# Patient Record
Sex: MALE | Race: BLACK OR AFRICAN AMERICAN | NOT HISPANIC OR LATINO | Employment: UNEMPLOYED | RURAL
[De-identification: names, ages, dates, MRNs, and addresses within clinical notes are randomized per-mention and may not be internally consistent; named-entity substitution may affect disease eponyms.]

---

## 2022-03-11 ENCOUNTER — HOSPITAL ENCOUNTER (EMERGENCY)
Facility: HOSPITAL | Age: 57
Discharge: HOME | End: 2022-03-12
Payer: MEDICARE

## 2022-03-11 DIAGNOSIS — N17.9 AKI (ACUTE KIDNEY INJURY): ICD-10-CM

## 2022-03-11 DIAGNOSIS — R06.02 SOB (SHORTNESS OF BREATH): ICD-10-CM

## 2022-03-11 DIAGNOSIS — R73.9 HYPERGLYCEMIA: Primary | ICD-10-CM

## 2022-03-11 LAB
ACETONE SERPL QL SCN: NEGATIVE
ALBUMIN SERPL BCP-MCNC: 3.7 G/DL (ref 3.5–5)
ALBUMIN/GLOB SERPL: 1.1 {RATIO}
ALP SERPL-CCNC: 137 U/L (ref 45–115)
ALT SERPL W P-5'-P-CCNC: 36 U/L (ref 16–61)
ANION GAP SERPL CALCULATED.3IONS-SCNC: 10 MMOL/L (ref 7–16)
AST SERPL W P-5'-P-CCNC: 11 U/L (ref 15–37)
BASOPHILS # BLD AUTO: 0.05 K/UL (ref 0–0.2)
BASOPHILS NFR BLD AUTO: 0.6 % (ref 0–1)
BILIRUB SERPL-MCNC: 0.3 MG/DL (ref 0–1.2)
BUN SERPL-MCNC: 21 MG/DL (ref 7–18)
BUN/CREAT SERPL: 15 (ref 6–20)
CALCIUM SERPL-MCNC: 9 MG/DL (ref 8.5–10.1)
CHLORIDE SERPL-SCNC: 102 MMOL/L (ref 98–107)
CO2 SERPL-SCNC: 27 MMOL/L (ref 21–32)
CREAT SERPL-MCNC: 1.39 MG/DL (ref 0.7–1.3)
DIFFERENTIAL METHOD BLD: ABNORMAL
EOSINOPHIL # BLD AUTO: 0.35 K/UL (ref 0–0.5)
EOSINOPHIL NFR BLD AUTO: 4.2 % (ref 1–4)
EOSINOPHIL NFR BLD MANUAL: 3 % (ref 1–4)
ERYTHROCYTE [DISTWIDTH] IN BLOOD BY AUTOMATED COUNT: 13 % (ref 11.5–14.5)
GLOBULIN SER-MCNC: 3.3 G/DL (ref 2–4)
GLUCOSE SERPL-MCNC: 162 MG/DL (ref 70–105)
GLUCOSE SERPL-MCNC: 467 MG/DL (ref 70–105)
GLUCOSE SERPL-MCNC: 492 MG/DL (ref 74–106)
HCT VFR BLD AUTO: 40.8 % (ref 40–54)
HGB BLD-MCNC: 14 G/DL (ref 13.5–18)
IMM GRANULOCYTES # BLD AUTO: 0.02 K/UL (ref 0–0.04)
IMM GRANULOCYTES NFR BLD: 0.2 % (ref 0–0.4)
LIPASE SERPL-CCNC: 274 U/L (ref 73–393)
LYMPHOCYTES # BLD AUTO: 2.85 K/UL (ref 1–4.8)
LYMPHOCYTES NFR BLD AUTO: 34.3 % (ref 27–41)
LYMPHOCYTES NFR BLD MANUAL: 38 % (ref 27–41)
MCH RBC QN AUTO: 30.6 PG (ref 27–31)
MCHC RBC AUTO-ENTMCNC: 34.3 G/DL (ref 32–36)
MCV RBC AUTO: 89.3 FL (ref 80–96)
MONOCYTES # BLD AUTO: 0.52 K/UL (ref 0–0.8)
MONOCYTES NFR BLD AUTO: 6.3 % (ref 2–6)
MONOCYTES NFR BLD MANUAL: 6 % (ref 2–6)
MPC BLD CALC-MCNC: 12 FL (ref 9.4–12.4)
NEUTROPHILS # BLD AUTO: 4.53 K/UL (ref 1.8–7.7)
NEUTROPHILS NFR BLD AUTO: 54.4 % (ref 53–65)
NEUTS SEG NFR BLD MANUAL: 53 % (ref 50–62)
NRBC # BLD AUTO: 0 X10E3/UL
NRBC, AUTO (.00): 0 %
PLATELET # BLD AUTO: 193 K/UL (ref 150–400)
PLATELET MORPHOLOGY: NORMAL
POTASSIUM SERPL-SCNC: 4.4 MMOL/L (ref 3.5–5.1)
PROT SERPL-MCNC: 7 G/DL (ref 6.4–8.2)
RBC # BLD AUTO: 4.57 M/UL (ref 4.6–6.2)
RBC MORPH BLD: NORMAL
SODIUM SERPL-SCNC: 135 MMOL/L (ref 136–145)
WBC # BLD AUTO: 8.32 K/UL (ref 4.5–11)

## 2022-03-11 PROCEDURE — 82962 GLUCOSE BLOOD TEST: CPT | Mod: 91

## 2022-03-11 PROCEDURE — 80053 COMPREHEN METABOLIC PANEL: CPT | Performed by: REGISTERED NURSE

## 2022-03-11 PROCEDURE — 99284 EMERGENCY DEPT VISIT MOD MDM: CPT | Mod: ,,, | Performed by: REGISTERED NURSE

## 2022-03-11 PROCEDURE — 25000003 PHARM REV CODE 250: Performed by: REGISTERED NURSE

## 2022-03-11 PROCEDURE — 36415 COLL VENOUS BLD VENIPUNCTURE: CPT | Performed by: REGISTERED NURSE

## 2022-03-11 PROCEDURE — 96374 THER/PROPH/DIAG INJ IV PUSH: CPT

## 2022-03-11 PROCEDURE — 99284 PR EMERGENCY DEPT VISIT,LEVEL IV: ICD-10-PCS | Mod: ,,, | Performed by: REGISTERED NURSE

## 2022-03-11 PROCEDURE — 63600175 PHARM REV CODE 636 W HCPCS: Performed by: REGISTERED NURSE

## 2022-03-11 PROCEDURE — 82009 KETONE BODYS QUAL: CPT | Performed by: REGISTERED NURSE

## 2022-03-11 PROCEDURE — 93010 EKG 12-LEAD: ICD-10-PCS | Mod: ,,, | Performed by: INTERNAL MEDICINE

## 2022-03-11 PROCEDURE — 99285 EMERGENCY DEPT VISIT HI MDM: CPT | Mod: 25

## 2022-03-11 PROCEDURE — 81003 URINALYSIS AUTO W/O SCOPE: CPT | Performed by: REGISTERED NURSE

## 2022-03-11 PROCEDURE — 96361 HYDRATE IV INFUSION ADD-ON: CPT

## 2022-03-11 PROCEDURE — 93005 ELECTROCARDIOGRAM TRACING: CPT

## 2022-03-11 PROCEDURE — 93010 ELECTROCARDIOGRAM REPORT: CPT | Mod: ,,, | Performed by: INTERNAL MEDICINE

## 2022-03-11 PROCEDURE — 83690 ASSAY OF LIPASE: CPT | Performed by: REGISTERED NURSE

## 2022-03-11 PROCEDURE — 85025 COMPLETE CBC W/AUTO DIFF WBC: CPT | Performed by: REGISTERED NURSE

## 2022-03-11 RX ORDER — INSULIN DEGLUDEC 100 U/ML
INJECTION, SOLUTION SUBCUTANEOUS DAILY
COMMUNITY

## 2022-03-11 RX ORDER — GABAPENTIN 300 MG/1
600 CAPSULE ORAL ONCE
Status: COMPLETED | OUTPATIENT
Start: 2022-03-12 | End: 2022-03-11

## 2022-03-11 RX ADMIN — GABAPENTIN 600 MG: 300 CAPSULE ORAL at 11:03

## 2022-03-11 RX ADMIN — HUMAN INSULIN 10 UNITS: 100 INJECTION, SOLUTION SUBCUTANEOUS at 10:03

## 2022-03-11 RX ADMIN — SODIUM CHLORIDE 1000 ML: 9 INJECTION, SOLUTION INTRAVENOUS at 10:03

## 2022-03-12 VITALS
OXYGEN SATURATION: 99 % | HEIGHT: 74 IN | RESPIRATION RATE: 13 BRPM | DIASTOLIC BLOOD PRESSURE: 87 MMHG | HEART RATE: 88 BPM | TEMPERATURE: 98 F | SYSTOLIC BLOOD PRESSURE: 134 MMHG | BODY MASS INDEX: 32.73 KG/M2 | WEIGHT: 255 LBS

## 2022-03-12 LAB
BILIRUB UR QL STRIP: NEGATIVE
CLARITY UR: CLEAR
COLOR UR: YELLOW
GLUCOSE UR STRIP-MCNC: >=1000 MG/DL
KETONES UR STRIP-SCNC: NEGATIVE MG/DL
LEUKOCYTE ESTERASE UR QL STRIP: NEGATIVE
NITRITE UR QL STRIP: NEGATIVE
PH UR STRIP: 6 PH UNITS
PROT UR QL STRIP: NEGATIVE
RBC # UR STRIP: NEGATIVE /UL
SP GR UR STRIP: <=1.005
UROBILINOGEN UR STRIP-ACNC: 0.2 MG/DL

## 2022-03-12 RX ORDER — GABAPENTIN 600 MG/1
600 TABLET ORAL 3 TIMES DAILY
COMMUNITY

## 2022-03-12 NOTE — ED PROVIDER NOTES
Encounter Date: 3/11/2022    SCRIBE #1 NOTE: I, Naila Hernandez, am scribing for, and in the presence of,  Srini Bowen MD. . I have scribed the following portions of the note - the EKG reading.       History     Chief Complaint   Patient presents with    Hyperglycemia     55 y/o male presents to ED complaining of elevated BS and abdominal pain ongoing for 2 days. Pt reports decreased appetite for 2 days. Denies fever, sob, n/v, chest pain, diarrhea or recent illness. Upon questioning he reports being out of Insulin and has not had any in 3 days. Pt has a past hx of IDDM, Gout, HTN, GERD, arthritis, tobacco smoking, EtOH use, and drug use. His listed PCP is Dr. Rodríguez.          Review of patient's allergies indicates:  No Known Allergies  Past Medical History:   Diagnosis Date    Diabetes mellitus     Gout, unspecified      Past Surgical History:   Procedure Laterality Date    HERNIA REPAIR      PROSTATE SURGERY       History reviewed. No pertinent family history.  Social History     Tobacco Use    Smoking status: Current Every Day Smoker     Types: Cigarettes    Smokeless tobacco: Never Used   Substance Use Topics    Alcohol use: Not Currently    Drug use: Never     Review of Systems   Constitutional: Positive for appetite change. Negative for activity change, chills, diaphoresis and fever.   HENT: Negative for congestion.    Respiratory: Negative for chest tightness and shortness of breath.    Cardiovascular: Negative for chest pain and palpitations.   Gastrointestinal: Positive for abdominal pain. Negative for anal bleeding, blood in stool, diarrhea, nausea and vomiting.   Genitourinary: Negative for dysuria.   Neurological: Positive for weakness. Negative for dizziness, light-headedness and headaches.       Physical Exam     Initial Vitals [03/11/22 2159]   BP Pulse Resp Temp SpO2   127/75 105 18 98.2 °F (36.8 °C) 98 %      MAP       --         Physical Exam    Vitals reviewed.  Constitutional:  He appears well-developed. He is not diaphoretic.   HENT:   Head: Normocephalic and atraumatic.   Eyes: EOM are normal. Pupils are equal, round, and reactive to light.   Neck: Neck supple.   Normal range of motion.  Cardiovascular: Normal rate and intact distal pulses.   No murmur heard.  Pulmonary/Chest: Breath sounds normal. No respiratory distress.   Abdominal: Abdomen is soft. Bowel sounds are normal. He exhibits no distension. There is no abdominal tenderness.   Musculoskeletal:         General: Normal range of motion.      Cervical back: Normal range of motion and neck supple.     Lymphadenopathy:     He has no cervical adenopathy.   Neurological: He is alert and oriented to person, place, and time. No cranial nerve deficit. GCS score is 15. GCS eye subscore is 4. GCS verbal subscore is 5. GCS motor subscore is 6.   Skin: Skin is warm and dry. Capillary refill takes less than 2 seconds.   Psychiatric: He has a normal mood and affect.         ED Course   Procedures  Labs Reviewed   COMPREHENSIVE METABOLIC PANEL - Abnormal; Notable for the following components:       Result Value    Sodium 135 (*)     Glucose 492 (*)     BUN 21 (*)     Creatinine 1.39 (*)     Alk Phos 137 (*)     AST 11 (*)     eGFR 56 (*)     All other components within normal limits   CBC WITH DIFFERENTIAL - Abnormal; Notable for the following components:    RBC 4.57 (*)     Monocytes % 6.3 (*)     Eosinophils % 4.2 (*)     All other components within normal limits   POCT GLUCOSE MONITORING CONTINUOUS - Abnormal; Notable for the following components:    POC Glucose 467 (*)     All other components within normal limits   LIPASE - Normal   CBC W/ AUTO DIFFERENTIAL    Narrative:     The following orders were created for panel order CBC auto differential.  Procedure                               Abnormality         Status                     ---------                               -----------         ------                     CBC with  Differential[[id number]]        Abnormal            Final result               Manual Differential[[id number]]                              Final result                 Please view results for these tests on the individual orders.   ACETONE   MANUAL DIFFERENTIAL   URINALYSIS     EKG Readings: (Independently Interpreted)   Heart Rate: 101.   Interpreted by Dr. Bowen:  Sinus Tachycardia.        Imaging Results          X-Ray Chest AP Portable (In process)                  Medications   gabapentin capsule 600 mg (has no administration in time range)   sodium chloride 0.9% bolus 1,000 mL (0 mLs Intravenous Stopped 3/11/22 2328)   insulin regular injection 10 Units (10 Units Intravenous Given 3/11/22 2226)                          Clinical Impression:   Final diagnoses:  [R06.02] SOB (shortness of breath)  [R73.9] Hyperglycemia (Primary)  [N17.9] ANNY (acute kidney injury)          ED Disposition Condition    Discharge Stable        ED Prescriptions     None        Follow-up Information    None          JOHNNA Jaime  03/11/22 2330       JOHNNA Jaime  03/11/22 7838

## 2022-03-12 NOTE — ED TRIAGE NOTES
Pt in with cc of hyperglycemia, pt is currently at [facility] [place] for alcohol detox and the patient states he ran out of his medicine while there.

## 2022-03-12 NOTE — ED NOTES
Discharge instructions reviewed with patient. Patient states he needs prescriptions for insulin and gabapentin. Dr Bowen informed and instructed the patient to follow up with primary care MD

## 2023-08-12 ENCOUNTER — HOSPITAL ENCOUNTER (EMERGENCY)
Facility: HOSPITAL | Age: 58
Discharge: HOME | End: 2023-08-12
Payer: MEDICARE

## 2023-08-12 VITALS
OXYGEN SATURATION: 100 % | BODY MASS INDEX: 34.36 KG/M2 | DIASTOLIC BLOOD PRESSURE: 91 MMHG | HEIGHT: 70 IN | WEIGHT: 240 LBS | HEART RATE: 103 BPM | TEMPERATURE: 99 F | RESPIRATION RATE: 17 BRPM | SYSTOLIC BLOOD PRESSURE: 142 MMHG

## 2023-08-12 DIAGNOSIS — R06.02 SHORTNESS OF BREATH: ICD-10-CM

## 2023-08-12 DIAGNOSIS — R06.00 DYSPNEA, UNSPECIFIED TYPE: ICD-10-CM

## 2023-08-12 DIAGNOSIS — J40 BRONCHITIS: Primary | ICD-10-CM

## 2023-08-12 LAB
ANION GAP SERPL CALCULATED.3IONS-SCNC: 8 MMOL/L (ref 7–16)
BASOPHILS # BLD AUTO: 0.04 K/UL (ref 0–0.2)
BASOPHILS NFR BLD AUTO: 0.5 % (ref 0–1)
BUN SERPL-MCNC: 11 MG/DL (ref 7–18)
BUN/CREAT SERPL: 9 (ref 6–20)
CALCIUM SERPL-MCNC: 9.1 MG/DL (ref 8.5–10.1)
CHLORIDE SERPL-SCNC: 101 MMOL/L (ref 98–107)
CO2 SERPL-SCNC: 26 MMOL/L (ref 21–32)
CREAT SERPL-MCNC: 1.27 MG/DL (ref 0.7–1.3)
DIFFERENTIAL METHOD BLD: ABNORMAL
EGFR (NO RACE VARIABLE) (RUSH/TITUS): 66 ML/MIN/1.73M2
EOSINOPHIL # BLD AUTO: 0.4 K/UL (ref 0–0.5)
EOSINOPHIL NFR BLD AUTO: 4.7 % (ref 1–4)
ERYTHROCYTE [DISTWIDTH] IN BLOOD BY AUTOMATED COUNT: 12.2 % (ref 11.5–14.5)
FLUAV AG UPPER RESP QL IA.RAPID: NEGATIVE
FLUBV AG UPPER RESP QL IA.RAPID: NEGATIVE
GLUCOSE SERPL-MCNC: 471 MG/DL (ref 74–106)
HCT VFR BLD AUTO: 40.5 % (ref 40–54)
HGB BLD-MCNC: 13.5 G/DL (ref 13.5–18)
IMM GRANULOCYTES # BLD AUTO: 0.03 K/UL (ref 0–0.04)
IMM GRANULOCYTES NFR BLD: 0.3 % (ref 0–0.4)
LYMPHOCYTES # BLD AUTO: 1.82 K/UL (ref 1–4.8)
LYMPHOCYTES NFR BLD AUTO: 21.2 % (ref 27–41)
MAGNESIUM SERPL-MCNC: 2.3 MG/DL (ref 1.7–2.3)
MCH RBC QN AUTO: 29.7 PG (ref 27–31)
MCHC RBC AUTO-ENTMCNC: 33.3 G/DL (ref 32–36)
MCV RBC AUTO: 89 FL (ref 80–96)
MONOCYTES # BLD AUTO: 0.64 K/UL (ref 0–0.8)
MONOCYTES NFR BLD AUTO: 7.5 % (ref 2–6)
MPC BLD CALC-MCNC: 10.9 FL (ref 9.4–12.4)
NEUTROPHILS # BLD AUTO: 5.65 K/UL (ref 1.8–7.7)
NEUTROPHILS NFR BLD AUTO: 65.8 % (ref 53–65)
NRBC # BLD AUTO: 0 X10E3/UL
NRBC, AUTO (.00): 0 %
PLATELET # BLD AUTO: 200 K/UL (ref 150–400)
POTASSIUM SERPL-SCNC: 4 MMOL/L (ref 3.5–5.1)
RBC # BLD AUTO: 4.55 M/UL (ref 4.6–6.2)
SARS-COV+SARS-COV-2 AG RESP QL IA.RAPID: NEGATIVE
SODIUM SERPL-SCNC: 131 MMOL/L (ref 136–145)
TROPONIN I SERPL DL<=0.01 NG/ML-MCNC: 5.4 PG/ML
WBC # BLD AUTO: 8.58 K/UL (ref 4.5–11)

## 2023-08-12 PROCEDURE — 83735 ASSAY OF MAGNESIUM: CPT | Performed by: NURSE PRACTITIONER

## 2023-08-12 PROCEDURE — 93010 EKG 12-LEAD: ICD-10-PCS | Mod: ,,, | Performed by: STUDENT IN AN ORGANIZED HEALTH CARE EDUCATION/TRAINING PROGRAM

## 2023-08-12 PROCEDURE — 99285 EMERGENCY DEPT VISIT HI MDM: CPT | Mod: 25

## 2023-08-12 PROCEDURE — 99284 PR EMERGENCY DEPT VISIT,LEVEL IV: ICD-10-PCS | Mod: ,,, | Performed by: NURSE PRACTITIONER

## 2023-08-12 PROCEDURE — 25000003 PHARM REV CODE 250: Performed by: NURSE PRACTITIONER

## 2023-08-12 PROCEDURE — 93010 ELECTROCARDIOGRAM REPORT: CPT | Mod: ,,, | Performed by: STUDENT IN AN ORGANIZED HEALTH CARE EDUCATION/TRAINING PROGRAM

## 2023-08-12 PROCEDURE — 96361 HYDRATE IV INFUSION ADD-ON: CPT

## 2023-08-12 PROCEDURE — 87428 SARSCOV & INF VIR A&B AG IA: CPT | Performed by: NURSE PRACTITIONER

## 2023-08-12 PROCEDURE — 93005 ELECTROCARDIOGRAM TRACING: CPT

## 2023-08-12 PROCEDURE — 63600175 PHARM REV CODE 636 W HCPCS: Performed by: NURSE PRACTITIONER

## 2023-08-12 PROCEDURE — 99284 EMERGENCY DEPT VISIT MOD MDM: CPT | Mod: ,,, | Performed by: NURSE PRACTITIONER

## 2023-08-12 PROCEDURE — 84484 ASSAY OF TROPONIN QUANT: CPT | Performed by: NURSE PRACTITIONER

## 2023-08-12 PROCEDURE — 96372 THER/PROPH/DIAG INJ SC/IM: CPT | Performed by: NURSE PRACTITIONER

## 2023-08-12 PROCEDURE — 96360 HYDRATION IV INFUSION INIT: CPT

## 2023-08-12 PROCEDURE — 85025 COMPLETE CBC W/AUTO DIFF WBC: CPT | Performed by: NURSE PRACTITIONER

## 2023-08-12 PROCEDURE — 80048 BASIC METABOLIC PNL TOTAL CA: CPT | Performed by: NURSE PRACTITIONER

## 2023-08-12 RX ORDER — AZITHROMYCIN 250 MG/1
250 TABLET, FILM COATED ORAL DAILY
Qty: 6 TABLET | Refills: 0 | Status: SHIPPED | OUTPATIENT
Start: 2023-08-12

## 2023-08-12 RX ORDER — SODIUM CHLORIDE 9 MG/ML
1000 INJECTION, SOLUTION INTRAVENOUS
Status: COMPLETED | OUTPATIENT
Start: 2023-08-12 | End: 2023-08-12

## 2023-08-12 RX ORDER — AMLODIPINE BESYLATE 10 MG/1
10 TABLET ORAL
COMMUNITY
Start: 2023-08-07

## 2023-08-12 RX ORDER — DEXAMETHASONE SODIUM PHOSPHATE 4 MG/ML
4 INJECTION, SOLUTION INTRA-ARTICULAR; INTRALESIONAL; INTRAMUSCULAR; INTRAVENOUS; SOFT TISSUE
Status: COMPLETED | OUTPATIENT
Start: 2023-08-12 | End: 2023-08-12

## 2023-08-12 RX ORDER — OMEPRAZOLE 40 MG/1
40 CAPSULE, DELAYED RELEASE ORAL
COMMUNITY
Start: 2023-08-07

## 2023-08-12 RX ADMIN — DEXAMETHASONE SODIUM PHOSPHATE 4 MG: 4 INJECTION, SOLUTION INTRA-ARTICULAR; INTRALESIONAL; INTRAMUSCULAR; INTRAVENOUS; SOFT TISSUE at 01:08

## 2023-08-12 RX ADMIN — SODIUM CHLORIDE 1000 ML: 9 INJECTION, SOLUTION INTRAVENOUS at 12:08

## 2023-08-12 NOTE — ED PROVIDER NOTES
Encounter Date: 8/12/2023       History     Chief Complaint   Patient presents with    Shortness of Breath     56 y/o male presents to the ED for c/o cough and shortness of breath onset 24 hours.  Reports productive cough, yellow.  No fever that he is aware of, denies chest pain currently.  Drinking plenty of fluids and tolerating well.      Review of patient's allergies indicates:  No Known Allergies  Past Medical History:   Diagnosis Date    Diabetes mellitus     Gout, unspecified      Past Surgical History:   Procedure Laterality Date    HERNIA REPAIR      PROSTATE SURGERY       History reviewed. No pertinent family history.  Social History     Tobacco Use    Smoking status: Every Day     Current packs/day: 0.00     Types: Cigarettes    Smokeless tobacco: Never   Substance Use Topics    Alcohol use: Not Currently    Drug use: Never     Review of Systems   Constitutional:  Negative for fever.   HENT:  Negative for sore throat.    Respiratory:  Positive for cough and shortness of breath.    Cardiovascular:  Negative for chest pain.   Gastrointestinal:  Negative for nausea.   Genitourinary:  Negative for dysuria.   Musculoskeletal:  Negative for back pain.   Skin:  Negative for rash.   Neurological:  Negative for weakness.   Hematological:  Does not bruise/bleed easily.   All other systems reviewed and are negative.      Physical Exam     Initial Vitals [08/12/23 1152]   BP Pulse Resp Temp SpO2   (!) 143/81 (!) 111 (!) 22 98.6 °F (37 °C) 99 %      MAP       --         Physical Exam    Constitutional: He appears well-developed and well-nourished.   HENT:   Head: Normocephalic.   Eyes: EOM are normal. Pupils are equal, round, and reactive to light.   Neck: Neck supple.   Cardiovascular:  Regular rhythm, normal heart sounds and intact distal pulses.   Tachycardia present.         Pulmonary/Chest: Breath sounds normal.   Abdominal: Abdomen is soft. Bowel sounds are normal.   Musculoskeletal:         General: Normal  range of motion.      Cervical back: Neck supple.     Neurological: He is alert and oriented to person, place, and time. He has normal strength. No cranial nerve deficit or sensory deficit. GCS score is 15. GCS eye subscore is 4. GCS verbal subscore is 5. GCS motor subscore is 6.   Skin: Skin is warm and dry. Capillary refill takes less than 2 seconds.   Psychiatric: He has a normal mood and affect. His behavior is normal. Thought content normal.         Medical Screening Exam   See Full Note    ED Course   Procedures  Labs Reviewed   BASIC METABOLIC PANEL - Abnormal; Notable for the following components:       Result Value    Sodium 131 (*)     Glucose 471 (*)     All other components within normal limits   CBC WITH DIFFERENTIAL - Abnormal; Notable for the following components:    RBC 4.55 (*)     Neutrophils % 65.8 (*)     Lymphocytes % 21.2 (*)     Monocytes % 7.5 (*)     Eosinophils % 4.7 (*)     All other components within normal limits   MAGNESIUM - Normal   TROPONIN I - Normal   SARS-COV2 (COVID) W/ FLU ANTIGEN - Normal    Narrative:     Negative SARS-CoV results should not be used as the sole basis for treatment or patient management decisions; negative results should be considered in the context of a patient's recent exposures, history and the presene of clinical signs and symptoms consistent with COVID-19.  Negative results should be treated as presumptive and confirmed by molecular assay, if necessary for patient management.   CBC W/ AUTO DIFFERENTIAL    Narrative:     The following orders were created for panel order CBC auto differential.  Procedure                               Abnormality         Status                     ---------                               -----------         ------                     CBC with Differential[[id number]]        Abnormal            Final result                 Please view results for these tests on the individual orders.          Imaging Results              X-Ray  Chest 1 View (Final result)  Result time 08/12/23 12:56:29      Final result by Timmy Cortes MD (08/12/23 12:56:29)                   Impression:      No acute cardiopulmonary process compared to the previous study      Electronically signed by: Timmy Cortes  Date:    08/12/2023  Time:    12:56               Narrative:    EXAMINATION:  XR CHEST 1 VIEW    CLINICAL HISTORY:  .  Shortness of breath    COMPARISON:  March 11, 2022    TECHNIQUE:  Chest x-ray AP    FINDINGS:  Cardiac silhouette is upper normal.  There is no mediastinal mass.  There is no pulmonary vascular engorgement.  Lungs and pleural spaces are generally clear.  Osseous structures are unchanged.                                       Medications   dexAMETHasone injection 4 mg (has no administration in time range)   0.9%  NaCl infusion (1,000 mLs Intravenous New Bag 8/12/23 1220)     Medical Decision Making:   Initial Assessment:   56 y/o male presents to the ED for c/o cough and shortness of breath onset 24 hours.  Reports productive cough, yellow.  No fever that he is aware of, denies chest pain currently.  Drinking plenty of fluids and tolerating well.      Differential Diagnosis:   Bronchitis  Pneumonia  Copd  chf  Clinical Tests:   Lab Tests: Reviewed  The following lab test(s) were unremarkable: CBC, BMP and Troponin       <> Summary of Lab: No acute findings  Radiological Study: Reviewed  ED Management:  [facility]    Patient presents for emergent evaluation of acute cough that poses a threat to life and/or bodily function.    In the ED patient found to have acute bronchitis.    I ordered labs and personally reviewed them.  Labs significant for no acute findings  I ordered X-rays and personally reviewed them and reviewed the radiologist interpretation.  Xray significant for no acute findings.    I ordered EKG and personally reviewed it.  EKG significant for NSR.        Discharge [facility]    Patient was managed in the ED with IV fluids and  decadron.    The response to treatment was good.    Patient was discharged in stable condition.  Detailed return precautions discussed.                          Clinical Impression:   Final diagnoses:  [R06.02] Shortness of breath  [R06.00] Dyspnea, unspecified type  [J40] Bronchitis (Primary)        ED Disposition Condition    Discharge Stable          ED Prescriptions       Medication Sig Dispense Start Date End Date Auth. Provider    azithromycin (Z-CACHORRO) 250 MG tablet Take 1 tablet (250 mg total) by mouth once daily. Take first 2 tablets together, then 1 every day until finished. 6 tablet 8/12/2023 -- Timmy Rodriguez, RENUKA          Follow-up Information    None          Timmy Rodriguez, RENUKA  08/14/23 0549

## 2024-05-21 ENCOUNTER — HOSPITAL ENCOUNTER (EMERGENCY)
Facility: HOSPITAL | Age: 59
Discharge: PSYCHIATRIC OR SUBSTANCE USE FACILITY | End: 2024-05-22
Payer: MEDICARE

## 2024-05-21 DIAGNOSIS — R42 DIZZY: ICD-10-CM
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
## 2024-05-21 PROCEDURE — 93010 ELECTROCARDIOGRAM REPORT: CPT | Mod: ,,, | Performed by: INTERNAL MEDICINE

## 2024-05-21 PROCEDURE — 99284 EMERGENCY DEPT VISIT MOD MDM: CPT | Mod: 25

## 2024-05-21 PROCEDURE — 93005 ELECTROCARDIOGRAM TRACING: CPT

## 2024-05-21 RX ORDER — DIPHENHYDRAMINE HCL 25 MG
4 CAPSULE ORAL
COMMUNITY

## 2024-05-21 RX ORDER — MULTIVITAMIN
1 TABLET ORAL DAILY
COMMUNITY

## 2024-05-21 RX ORDER — LANOLIN ALCOHOL/MO/W.PET/CERES
100 CREAM (GRAM) TOPICAL DAILY
COMMUNITY

## 2024-05-21 RX ORDER — QUETIAPINE 400 MG/1
TABLET, FILM COATED, EXTENDED RELEASE ORAL DAILY
COMMUNITY

## 2024-05-22 ENCOUNTER — TELEPHONE (OUTPATIENT)
Dept: EMERGENCY MEDICINE | Facility: HOSPITAL | Age: 59
End: 2024-05-22
Payer: MEDICARE

## 2024-05-22 VITALS
HEIGHT: 74 IN | WEIGHT: 250 LBS | DIASTOLIC BLOOD PRESSURE: 78 MMHG | HEART RATE: 67 BPM | SYSTOLIC BLOOD PRESSURE: 129 MMHG | TEMPERATURE: 98 F | RESPIRATION RATE: 13 BRPM | BODY MASS INDEX: 32.08 KG/M2 | OXYGEN SATURATION: 94 %

## 2024-05-22 NOTE — ED NOTES
Transport information sent to [facility] for transfer to [facility] - confirmation # [id number]

## 2024-05-22 NOTE — ED PROVIDER NOTES
Encounter Date: 5/21/2024       History     Chief Complaint   Patient presents with    Fall    Dizziness     58-year-old male presents to ED with complaint of dizziness and left knee pain status post fall.  Patient reports the he was ambulating and got dizzy suddenly and fell landing on his left knee.  Patient was currently admitted to [place] for cocaine and alcohol abuse.  Denies chest pain, shortness of breath, nausea/vomiting.    The history is provided by the patient and the EMS personnel.     Review of patient's allergies indicates:  No Known Allergies  Past Medical History:   Diagnosis Date    Diabetes mellitus     Gout, unspecified     Hypertension      Past Surgical History:   Procedure Laterality Date    HERNIA REPAIR      PROSTATE SURGERY       No family history on file.  Social History     Tobacco Use    Smoking status: Every Day     Types: Cigarettes    Smokeless tobacco: Never   Substance Use Topics    Alcohol use: Yes     Review of Systems   Constitutional:  Negative for chills and fever.   Eyes:  Negative for photophobia and visual disturbance.   Respiratory:  Negative for cough and shortness of breath.    Cardiovascular:  Negative for chest pain and palpitations.   Gastrointestinal:  Negative for nausea and vomiting.   Musculoskeletal:  Positive for arthralgias. Negative for gait problem.   Skin:  Negative for color change and wound.   Neurological:  Positive for dizziness. Negative for weakness.   Hematological:  Negative for adenopathy. Does not bruise/bleed easily.   Psychiatric/Behavioral:  Negative for agitation and confusion.    All other systems reviewed and are negative.      Physical Exam     Initial Vitals [05/21/24 2332]   BP Pulse Resp Temp SpO2   111/87 92 18 97.6 °F (36.4 °C) (!) 93 %      MAP       --         Physical Exam    Nursing note and vitals reviewed.  Constitutional: He appears well-developed and well-nourished.   HENT:   Head: Normocephalic and atraumatic.   Eyes: EOM are  normal. Pupils are equal, round, and reactive to light.   Neck: Neck supple.   Normal range of motion.  Cardiovascular:  Normal rate and regular rhythm.           No murmur heard.  Pulmonary/Chest: He has no wheezes. He has no rhonchi.   Abdominal: Abdomen is soft. He exhibits no distension. There is no abdominal tenderness.   Musculoskeletal:         General: No tenderness or edema.      Cervical back: Normal range of motion and neck supple.     Lymphadenopathy:     He has no cervical adenopathy.   Neurological: He is alert and oriented to person, place, and time. No cranial nerve deficit or sensory deficit.   Skin: Skin is warm and dry. Capillary refill takes less than 2 seconds.   Psychiatric: He has a normal mood and affect. Thought content normal.         Medical Screening Exam   See Full Note    ED Course   Procedures  Labs Reviewed - No data to display       Imaging Results              X-Ray Knee AP LAT with [place] Left (In process)  Result time 05/22/24 00:13:00   Procedure changed from X-Ray Knee 3 View Left                    Medications - No data to display  Medical Decision Making  58-year-old male presents to ED with complaint of dizziness and left knee pain status post fall.  Patient reports the he was ambulating and got dizzy suddenly and fell landing on his left knee.  Patient was currently admitted to [place] for cocaine and alcohol abuse.  Denies chest pain, shortness of breath, nausea/vomiting.    Diagnostics obtained and reviewed.  Case discussed with Dr. Bowen.     Amount and/or Complexity of Data Reviewed  Radiology: ordered.              Attending Attestation:     Physician Attestation Statement for NP/PA:   I personally made/approved the management plan and take responsibility for the patient management.                                     Clinical Impression:   Final diagnoses:  [W19.XXXA] Fall with injury  [R42] Dizzy        ED Disposition Condition    Discharge Stable          ED  Prescriptions    None       Follow-up Information    None          Srini Bowen MD  05/22/24 3631

## 2024-05-22 NOTE — ED TRIAGE NOTES
Pt was admitted to [place] today for alcohol and cocaine, pt got dizzy  and fell hitting his left knee

## 2024-05-28 LAB
OHS QRS DURATION: 84 MS
OHS QTC CALCULATION: 404 MS